# Patient Record
Sex: FEMALE
[De-identification: names, ages, dates, MRNs, and addresses within clinical notes are randomized per-mention and may not be internally consistent; named-entity substitution may affect disease eponyms.]

---

## 2022-06-27 ENCOUNTER — APPOINTMENT (OUTPATIENT)
Dept: PLASTIC SURGERY | Facility: CLINIC | Age: 37
End: 2022-06-27
Payer: MEDICAID

## 2022-06-27 VITALS — WEIGHT: 160 LBS

## 2022-06-27 DIAGNOSIS — D48.5 NEOPLASM OF UNCERTAIN BEHAVIOR OF SKIN: ICD-10-CM

## 2022-06-27 DIAGNOSIS — L72.0 EPIDERMAL CYST: ICD-10-CM

## 2022-06-27 PROBLEM — Z00.00 ENCOUNTER FOR PREVENTIVE HEALTH EXAMINATION: Status: ACTIVE | Noted: 2022-06-27

## 2022-06-27 PROCEDURE — 99203 OFFICE O/P NEW LOW 30 MIN: CPT

## 2022-06-27 NOTE — REASON FOR VISIT
[Initial Evaluation] : an initial evaluation [Family Member] : family member [[employer] ] : provided by [employer]   [FreeTextEntry1] : Liset Bower PA-C at [facility]  [Interpreters_IDNumber] : [id number] [Interpreters_FullName] : Tino

## 2022-06-27 NOTE — PHYSICAL EXAM
[de-identified] : Well developed well nourished in NAD [de-identified] : Atraumatic normocephalic\par facial animation symmetric [de-identified] : Supple [de-identified] : Non labored [de-identified] : Forehead, approx 6cm above medial canthus: 1cm circular, non tender, mobile subcutaneous mass with darkened/black non-ulcerated center. No erythema/drainage or /cellulitis.

## 2022-06-27 NOTE — ASSESSMENT
[FreeTextEntry1] : 36 y/o female with no PMH with c/o forehead cyst with skin changes\par \par r/o concurrent skin malignancy\par recommend right forehead cyst and skin excision in office under local anesthesia\par \par -Regarding surgical excision of mass, the risks include but are not limited to bleeding, infection, hematoma, numbness, wound separation, poor wound healing, recurrence of mass, contour defect, scarring, hypertrophic scar/keloid formation, possible malignancy finding and need for additional  surgery, and dissatisfaction with outcome.\par -The patient understands the risks. All questions were answered to the patient's apparent satisfaction.\par -Informed consent was obtained.\par -Will schedule as soon as possible\par \par -Photos taken.\par \par Due to COVID-19, pre-visit patient instructions were explained to the patient and their symptoms were checked upon arrival. Masks were used by the healthcare provider and staff and the examination room was cleaned after the patient visit concluded\par \par \par \par \par \par

## 2022-06-27 NOTE — HISTORY OF PRESENT ILLNESS
[FreeTextEntry1] : 36 y/o female with no PMH who presents with c/o non-tender dark colored mass on forehead which appeared 3 months ago. Denies any trauma to the area. Denies erythema or drainage. Saw derm 5/11/22, no biopsy or imaging completed. Referred for excision. 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

## 2022-07-27 ENCOUNTER — APPOINTMENT (OUTPATIENT)
Dept: PLASTIC SURGERY | Facility: CLINIC | Age: 37
End: 2022-07-27

## 2022-07-27 PROCEDURE — 13131 CMPLX RPR F/C/C/M/N/AX/G/H/F: CPT | Mod: 59

## 2022-07-27 PROCEDURE — 11441 EXC FACE-MM B9+MARG 0.6-1 CM: CPT

## 2022-07-28 NOTE — ASSESSMENT
[FreeTextEntry1] : 38 y/o female with no PMH with c/o forehead cyst with skin changes\par \par r/o concurrent skin malignancy\par recommend right forehead cyst and skin excision in office under local anesthesia\par s/p excision of forehead cystic lesion r/o vascular malformation\par \par -keep dressing in place x 3 days\par -may get area wet in 2 days\par -Tylenol\par -no heavy lifting\par -no strenuous activity\par -sun protection\par -follow up in 1 week for suture removal\par \par -Photos taken at last visit\par \par Due to COVID-19, pre-visit patient instructions were explained to the patient and their symptoms were checked upon arrival. Masks were used by the healthcare provider and staff and the examination room was cleaned after the patient visit concluded\par \par \par \par \par \par

## 2022-07-28 NOTE — HISTORY OF PRESENT ILLNESS
[FreeTextEntry1] : Recent PEt/CT reviewed with the patient and her daughter.  Pt s/p endobronchial biopsy of the PET + region in the left lower lobe/roxana aortic region without conclusive diagnosis.  Plan is for CT guided core needle biopsy of the PET + region with anesthesia.  Risks of percutaneous biopsy, including but not limited to bleeding, infection and injury to surrounding structure, discussed with the patient.  All questions were answered.  Patient elects to proceed with the biopsy.  [FreeTextEntry1] : 38 y/o female with no PMH who presents with c/o non-tender dark colored mass on forehead which appeared 3 months ago. Denies any trauma to the area. Denies erythema or drainage. Saw derm 5/11/22, no biopsy or imaging completed. Referred for excision. 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  Daughter present.  Reports the mass has increased in size since consultation.  Denies fevers, chills, drainage, redness.

## 2022-07-28 NOTE — PHYSICAL EXAM
[de-identified] : Well developed well nourished in NAD [de-identified] : Atraumatic normocephalic\par facial animation symmetric [de-identified] : Supple [de-identified] : Non labored [de-identified] : Forehead, approx 6cm above medial canthus: 1cm circular, non tender, mobile subcutaneous mass with darkened/black non-ulcerated center. No erythema/drainage or /cellulitis.

## 2022-07-28 NOTE — PROCEDURE
[Nl] : None [FreeTextEntry2] :  right forehead cyst and skin lesion excision [FreeTextEntry1] : right forehead cyst and atypical skin lesion [FreeTextEntry3] : l [FreeTextEntry6] : Benefits, risks and alternatives of the procedure were discussed. The risks include but not limited to bleeding, infection, poor wound healing and scarring, possible keloid, and need for re-operation. Location of scar and expected post-surgical outcomes were discussed. The patient understands the risks and would like to proceed with the office surgery.\par \par The skin lesion was marked and infiltrated with local anesthesia to effect.  The excision site was prepared and draped in sterile fashion.\par The skin lesion was excised with the indicated margins in the usual fashion and sent to pathology for review. \par Surgical wounds were made hemostatic and repaired as follows:\par \par Site: forehead\par Skin lesion width (with margins): 1 cm\par Closure complexity and length: complex 2.5 cm, 4-0 monocryl deep dermal and running subcuticular)\par

## 2022-08-03 ENCOUNTER — APPOINTMENT (OUTPATIENT)
Dept: PLASTIC SURGERY | Facility: CLINIC | Age: 37
End: 2022-08-03

## 2022-08-03 NOTE — ASSESSMENT
[FreeTextEntry1] : 36 y/o female with no PMH with c/o forehead cyst with skin changes\par \par 1 week s/p excision of forehead mass. Doing well. Here with daughter who translates\par \par - Steri placed, keep on 5-7 days. \par - Aquaphor BID after steris fall off on their own. Scar cream recs provided\par -Tylenol pRn for pain\par -no heavy lifting or strenuous activity. pt has a wedding tomorrow night \par -sun protection\par -follow up 2 weeks for path review & scar management \par \par \par Due to COVID-19, pre-visit patient instructions were explained to the patient and their symptoms were checked upon arrival. Masks were used by the healthcare provider and staff and the examination room was cleaned after the patient visit concluded\par \par \par \par \par \par

## 2022-08-03 NOTE — PHYSICAL EXAM
[de-identified] : Well developed well nourished in NAD [de-identified] : Forehead, approx 6cm above medial canthus incision is CDI without evidence of seroma/hematoma/cellulitis. Skin edges well approximated. No drainage

## 2022-08-03 NOTE — HISTORY OF PRESENT ILLNESS
[FreeTextEntry1] : 36 y/o female with no PMH who presents with c/o non-tender dark colored mass on forehead which appeared 3 months ago. Denies any trauma to the area. Denies erythema or drainage. Saw derm 5/11/22, no biopsy or imaging completed. Referred for excision. 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  Daughter present.  Reports the mass has increased in size since consultation.  Denies fevers, chills, drainage, redness.\par \par Interval hx (8/3/22): Pt is 1 week s/p excision of forehead mass, no path in chart yet. Doing well. Denies fevers, chills, drainage, redness.Here with daughter.

## 2022-08-09 LAB — CORE LAB BIOPSY: NORMAL

## 2022-08-16 ENCOUNTER — APPOINTMENT (OUTPATIENT)
Dept: PLASTIC SURGERY | Facility: CLINIC | Age: 37
End: 2022-08-16

## 2022-08-16 PROCEDURE — 99212 OFFICE O/P EST SF 10 MIN: CPT

## 2022-08-16 NOTE — DATA REVIEWED
[FreeTextEntry1] : Tissue Biopsy             Final\par \par No Documents Attached\par \par  Patient:   LORIE CHRISTIANSEN\par \par \par Accession:                             [id number]\par \par Collected Date/Time:                   7/27/2022 13:43 EDT\par Received Date/Time:                    7/28/2022 08:36 EDT\par \par Surgical Pathology Report - Auth (Verified)\par \par Specimen(s) Submitted\par Right forehead cystic lesion\par \par Final Diagnosis\par Right forehead cystic lesion, excision:\par - Skin and ruptured cystic lesion with acute and chronic inflammation,\par  hemorrhage, many hemosiderin laden histiocytes, multinucleated giant cell\par  reaction.\par \par Verified by: Higinio Flowers MD\par (Electronic Signature)\par Reported on: 08/09/22 13:28 EDT, [facility],\[place] [address]\par Histology technical processing performed at [address] [place] [address] Phone: [phone]   Fax: [phone]\par _________________________________________________________________\par \par \par Perioperative Diagnosis\par D48.5-Neoplasm of uncertain behavior of skin\par \par Gross Description\par The specimen is received in formalin labeled "right forehead cystic\par  lesion". The specimen consists of an ellipse of skin with subcutaneous\par  tissue, measures 1 x 0.6 x 0.2 cm. The skin measures 0.8 x 0.4 x 0.1 cm.\par  The base is inked black. Serial sections reveal a cyst measures 0.4 x 0.3\par  x 0.3 cm. The specimen is submitted entirely. (1 block)\par \par Specimen was received and underwent gross examination at [place] [address].\par \par 07/28/2022 18:34:48 EDT   HH\par \par \par  Ordered by: ONEAL YEH       Collected/Examined: [id number] 01:43PM       \par Verification Required       Stage: Final       \par  Performed at: [facility] (Med Director: Parker Atkins)       Resulted: [id number] 01:28PM       Last Updated: [id number] 01:28PM       Accession: [id number]

## 2022-08-16 NOTE — HISTORY OF PRESENT ILLNESS
[FreeTextEntry1] : 36 y/o female with no PMH who presents with c/o non-tender dark colored mass on forehead which appeared 3 months ago. Denies any trauma to the area. Denies erythema or drainage. Saw derm 5/11/22, no biopsy or imaging completed. Referred for excision. 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  Daughter present.  Reports the mass has increased in size since consultation.  Denies fevers, chills, drainage, redness.\par \par Interval hx (8/3/22): Pt is 1 week s/p excision of forehead mass, no path in chart yet. Doing well. Denies fevers, chills, drainage, redness.Here with daughter. \par \par Interval hx (8/16/22):  Pt is 3 weeks s/p excision of R forehead cystic lesion and doing very well without any issues to note. No fever/chills/drainage. Using aquaphor. No scar cream yet. Path attached. Had family member on phone to translate.

## 2022-08-16 NOTE — ASSESSMENT
[FreeTextEntry1] : 36 y/o female with no PMH with c/o forehead cyst with skin changes\par \par 3 weeks s/p excision of forehead mass. Doing very well.\par \par - Start silicone based scar cream\par - Cleared for all activity without restriction\par - sun protection emphasized\par - Pathology reviewed\par -follow pRn \par \par \par Due to COVID-19, pre-visit patient instructions were explained to the patient and their symptoms were checked upon arrival. Masks were used by the healthcare provider and staff and the examination room was cleaned after the patient visit concluded\par \par \par \par \par \par

## 2022-08-16 NOTE — PHYSICAL EXAM
[de-identified] : Well developed well nourished in NAD [de-identified] : 1cm horizontal forehead incision (approx 6cm above medial canthus) is healing very well, beginning to blend in with native skin. incision is CDI without evidence of infxn. No drainage